# Patient Record
Sex: MALE | Race: BLACK OR AFRICAN AMERICAN | NOT HISPANIC OR LATINO | Employment: STUDENT | URBAN - METROPOLITAN AREA
[De-identification: names, ages, dates, MRNs, and addresses within clinical notes are randomized per-mention and may not be internally consistent; named-entity substitution may affect disease eponyms.]

---
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
## 2023-03-01 ENCOUNTER — HOSPITAL ENCOUNTER (EMERGENCY)
Facility: HOSPITAL | Age: 18
Discharge: HOME | End: 2023-03-01
Attending: EMERGENCY MEDICINE
Payer: MEDICAID

## 2023-03-01 VITALS
HEART RATE: 75 BPM | DIASTOLIC BLOOD PRESSURE: 79 MMHG | WEIGHT: 140 LBS | TEMPERATURE: 99 F | SYSTOLIC BLOOD PRESSURE: 128 MMHG | RESPIRATION RATE: 18 BRPM | OXYGEN SATURATION: 100 % | BODY MASS INDEX: 20.04 KG/M2 | HEIGHT: 70 IN

## 2023-03-01 DIAGNOSIS — N50.89 TESTICLE SWELLING: ICD-10-CM

## 2023-03-01 DIAGNOSIS — N43.3 RIGHT HYDROCELE: Primary | ICD-10-CM

## 2023-03-01 DIAGNOSIS — N45.1 EPIDIDYMITIS, RIGHT: ICD-10-CM

## 2023-03-01 LAB
BILIRUBIN, POC UA: NEGATIVE
BLOOD, POC UA: ABNORMAL
CLARITY, POC UA: CLEAR
COLOR, POC UA: YELLOW
GLUCOSE, POC UA: NEGATIVE
KETONES, POC UA: NEGATIVE
LEUKOCYTE EST, POC UA: NEGATIVE
NITRITE, POC UA: NEGATIVE
PH UR STRIP: 6 [PH]
PROTEIN, POC UA: ABNORMAL
SPECIFIC GRAVITY, POC UA: >=1.03
UROBILINOGEN, POC UA: 1 E.U./DL

## 2023-03-01 PROCEDURE — 96372 THER/PROPH/DIAG INJ SC/IM: CPT | Performed by: NURSE PRACTITIONER

## 2023-03-01 PROCEDURE — 25000003 PHARM REV CODE 250: Mod: ER | Performed by: NURSE PRACTITIONER

## 2023-03-01 PROCEDURE — 87591 N.GONORRHOEAE DNA AMP PROB: CPT | Performed by: NURSE PRACTITIONER

## 2023-03-01 PROCEDURE — 63600175 PHARM REV CODE 636 W HCPCS: Mod: ER | Performed by: NURSE PRACTITIONER

## 2023-03-01 PROCEDURE — 99285 EMERGENCY DEPT VISIT HI MDM: CPT | Mod: ER

## 2023-03-01 RX ORDER — LIDOCAINE HYDROCHLORIDE 10 MG/ML
5 INJECTION INFILTRATION; PERINEURAL
Status: COMPLETED | OUTPATIENT
Start: 2023-03-01 | End: 2023-03-01

## 2023-03-01 RX ORDER — CEFTRIAXONE 500 MG/1
500 INJECTION, POWDER, FOR SOLUTION INTRAMUSCULAR; INTRAVENOUS
Status: COMPLETED | OUTPATIENT
Start: 2023-03-01 | End: 2023-03-01

## 2023-03-01 RX ORDER — SULINDAC 150 MG/1
150 TABLET ORAL 2 TIMES DAILY
Qty: 10 TABLET | Refills: 0 | Status: SHIPPED | OUTPATIENT
Start: 2023-03-01 | End: 2023-03-06

## 2023-03-01 RX ORDER — LEVOFLOXACIN 500 MG/1
500 TABLET, FILM COATED ORAL
Status: COMPLETED | OUTPATIENT
Start: 2023-03-01 | End: 2023-03-01

## 2023-03-01 RX ORDER — LEVOFLOXACIN 500 MG/1
500 TABLET, FILM COATED ORAL DAILY
Qty: 9 TABLET | Refills: 0 | Status: SHIPPED | OUTPATIENT
Start: 2023-03-02 | End: 2023-03-11

## 2023-03-01 RX ADMIN — LEVOFLOXACIN 500 MG: 500 TABLET, FILM COATED ORAL at 05:03

## 2023-03-01 RX ADMIN — CEFTRIAXONE SODIUM 500 MG: 500 INJECTION, POWDER, FOR SOLUTION INTRAMUSCULAR; INTRAVENOUS at 05:03

## 2023-03-01 RX ADMIN — LIDOCAINE HYDROCHLORIDE 5 ML: 10 INJECTION, SOLUTION INFILTRATION; PERINEURAL at 05:03

## 2023-03-01 NOTE — DISCHARGE INSTRUCTIONS
Take antibiotics as ordered. You have been prescribed clinoril (sulindac), an anti-inflammatory.  Take this medication whether you feel you need it or not.  Do not take ibuprofen, naproxen or other NSAID's medications while taking this medication. Return to the Emergency Department for any worsening, change in condition, or any emergent concerns.

## 2023-03-01 NOTE — ED PROVIDER NOTES
Encounter Date: 3/1/2023    SCRIBE #1 NOTE: I, Angy Pennington, am scribing for, and in the presence of,  Nicolas Tejeda DNP. I have scribed the following portions of the note - Other sections scribed: HPI, ROS, PE.     History     Chief Complaint   Patient presents with    Groin Swelling     Intermittent over past week     18 year old male presents to the ER for intermittent right testicular pain and swelling for one week, which he rates as 9/10. No associated symptoms including urinary frequency urgency hematuria or dysuria, penile discharge or bleeding, lumps bumps or lesions.. No treatment for symptoms. Denies fever, abdominal pain, frequency, urgency, hematuria, diarrhea, or other symptoms. No further complaints at present time.      The history is provided by the patient. No  was used.   Review of patient's allergies indicates:  No Known Allergies  No past medical history on file.  No past surgical history on file.  No family history on file.     Review of Systems   Constitutional:  Negative for appetite change, chills, diaphoresis, fatigue and fever.   HENT:  Negative for congestion, ear discharge, ear pain, postnasal drip, rhinorrhea, sinus pressure, sneezing, sore throat and voice change.    Eyes:  Negative for discharge, itching and visual disturbance.   Respiratory:  Negative for cough, shortness of breath and wheezing.    Cardiovascular:  Negative for chest pain, palpitations and leg swelling.   Gastrointestinal:  Negative for abdominal pain, nausea and vomiting.   Endocrine: Negative for polydipsia, polyphagia and polyuria.   Genitourinary:  Negative for difficulty urinating, dysuria, frequency, hematuria, penile discharge, penile pain, penile swelling and urgency.        Pos groin pain and swelling.    Musculoskeletal:  Negative for arthralgias and myalgias.   Skin:  Negative for rash and wound.   Neurological:  Negative for dizziness, seizures, syncope and weakness.    Hematological:  Negative for adenopathy. Does not bruise/bleed easily.   Psychiatric/Behavioral:  Negative for agitation and self-injury. The patient is not nervous/anxious.      Physical Exam     Initial Vitals [03/01/23 1558]   BP Pulse Resp Temp SpO2   129/86 70 20 98.6 °F (37 °C) 100 %      MAP       --         Physical Exam    Nursing note and vitals reviewed.  Constitutional: He appears well-developed and well-nourished. He is not diaphoretic. No distress.   HENT:   Head: Normocephalic and atraumatic.   Right Ear: External ear normal.   Left Ear: External ear normal.   Nose: Nose normal.   Eyes: Pupils are equal, round, and reactive to light. Right eye exhibits no discharge. Left eye exhibits no discharge. No scleral icterus.   Neck:   Normal range of motion.  Pulmonary/Chest: No respiratory distress.   Abdominal: He exhibits no distension. Hernia confirmed negative in the right inguinal area and confirmed negative in the left inguinal area.   Genitourinary:    Penis normal.   Right testis shows swelling and tenderness. Right testis shows no mass. Right testis is descended. Cremasteric reflex is not absent on the right side. Left testis shows no mass, no swelling and no tenderness. Left testis is descended. Cremasteric reflex is not absent on the left side.   Musculoskeletal:         General: Normal range of motion.      Cervical back: Normal range of motion.     Lymphadenopathy: No inguinal adenopathy noted on the right or left side.   Neurological: He is alert and oriented to person, place, and time.   Skin: Skin is dry. Capillary refill takes less than 2 seconds.       ED Course   Procedures  Labs Reviewed   POCT URINALYSIS W/O SCOPE - Abnormal; Notable for the following components:       Result Value    Spec Grav UA >=1.030 (*)     Blood, UA Trace-intact (*)     Protein, UA 2+ (*)     All other components within normal limits   C. TRACHOMATIS/N. GONORRHOEAE BY AMP DNA   POCT URINALYSIS W/O SCOPE           Imaging Results              US Scrotum And Testicles (Final result)  Result time 03/01/23 16:53:02      Final result by Donaldo Aquino MD (03/01/23 16:53:02)                   Impression:      Complex right hydrocele, possibly reactive, no convincing sonographic findings to suggest right epididymo-orchitis.    Left varicocele.      Electronically signed by: Donaldo Aquino MD  Date:    03/01/2023  Time:    16:53               Narrative:    EXAMINATION:  US SCROTUM AND TESTICLES    CLINICAL HISTORY:  Other specified disorders of the male genital organs    TECHNIQUE:  Sonography of the scrotum and testes.    COMPARISON:  None.    FINDINGS:  The right testicle measures approximately 2.3 x 2.2 x 2.8 cm with homogeneous echotexture.  Arterial and venous flow is documented to the right testicle without abnormal testicular or epididymal hyperemia.  There is a 2-3 mm right epididymal head cyst.  No right varicocele.  There is a complex.  Right testicular appendage noted.    The left testicle measures approximately 1.9 x 2.1 x 2.9 cm with homogeneous echotexture.  Arterial and venous flow is documented to the left testicle without abnormal testicular or epididymal hyperemia.  There is no left hydrocele.  There is left varicocele.                                       Medications   cefTRIAXone injection 500 mg (500 mg Intramuscular Given 3/1/23 1713)   LIDOcaine HCL 10 mg/ml (1%) injection 5 mL (5 mLs Infiltration Given 3/1/23 1713)   levoFLOXacin tablet 500 mg (500 mg Oral Given 3/1/23 1713)     Medical Decision Making:   History:   Old Medical Records: I decided to obtain old medical records.  Initial Assessment:   18 year old male presents to the ER for groin pain and swelling for 1 week. Denies any associated symptoms. Denies treatment for symptoms. Scrotum and testicles US ordered.  On genitourinary examination the right testicle was swollen and tender, the patient was unable to localize tenderness.  The left  testing was normal.  Penis was normal.  There were no lumps bumps lesions, hernias, penile discharge or swelling.  Differential Diagnosis:   Includes but is not limited to: inguinal hernia, femoral hernia, reactive or malignant adenopathy, iliac or femoral aneurysm or pseudoaneurysm, sebaceous cyst, epididymitis, testicular torsion, lipoma, hidradenitis, varicocele, hydrocele, ectopic testicle, and undescended testicle.  Clinical Tests:   Lab Tests: Reviewed and Ordered  Radiological Study: Ordered and Reviewed  ED Management:  Following results of the ultrasound below as well as the urinalysis I treated the patient with Rocephin 500 mg with lidocaine IM as well as Levaquin 500 mg p.o. q.day times 10 days.  He is discharged to follow-up with urology for hydrocele and epididymitis orchitis.  Clinoril was prescribed for pain and inflammation.      Scribe attestation: Scribe attestation: Nicolas HAMILTON DNP ACNP-BC FNP-C ENP-C,  personally performed the services described in this documentation. All medical record entries made by the scribe were at my direction and in my presence.  I have reviewed the chart and agree that the record reflects my personal performance and is accurate and complete.      Scribe Attestation:   Scribe #1: I performed the above scribed service and the documentation accurately describes the services I performed. I attest to the accuracy of the note.      ED Course as of 03/01/23 2327   Wed Mar 01, 2023   1610 BP: 129/86 [VC]   1611 Temp: 98.6 °F (37 °C) [VC]   1611 Temp Source: Oral [VC]   1611 Pulse: 70 [VC]   1611 Resp: 20 [VC]   1611 SpO2: 100 % [VC]   1704 US Scrotum And Testicles  Complex right hydrocele, possibly reactive, no convincing sonographic findings to suggest right epididymo-orchitis.     Left varicocele. [VC]   1704 C. trachomatis/N. gonorrhoeae by AMP DNA  Pending at time of disposition. [VC]      ED Course User Index  [VC] Nicolas Tejeda DNP                 Clinical  "Impression:   Final diagnoses:  [N50.89] Testicle swelling  [N43.3] Right hydrocele (Primary)  [N45.1] Epididymitis, right        ED Disposition Condition    Discharge Stable        Vital signs at the time of disposition were:  /79 (BP Location: Right arm, Patient Position: Sitting)   Pulse 75   Temp 98.7 °F (37.1 °C) (Oral)   Resp 18   Ht 5' 10" (1.778 m)   Wt 63.5 kg (140 lb)   SpO2 100%   BMI 20.09 kg/m²       See AVS for additional recommendations. Medications listed herein were prescribed after reviewing the patient's allergies, medication list, history, most recent laboratories as available.  Referrals below were provided after reviewing the patient's previous medical providers. He understands he  should return for any worsening or changes in condition.  Prior to discharge the patient was asked if he  had any additional concerns or complaints and he declined. The patient was given an opportunity to ask questions and all were answered to his satisfaction.   ED Prescriptions       Medication Sig Dispense Start Date End Date Auth. Provider    levoFLOXacin (LEVAQUIN) 500 MG tablet Take 1 tablet (500 mg total) by mouth once daily. for 9 days 9 tablet 3/2/2023 3/11/2023 Nicolas Tejeda DNP    sulindac (CLINORIL) 150 MG tablet Take 1 tablet (150 mg total) by mouth 2 (two) times daily. for 5 days 10 tablet 3/1/2023 3/6/2023 Nicolas Tejeda DNP          Follow-up Information       Follow up With Specialties Details Why Contact Info    Jack Scott MD Urology Schedule an appointment as soon as possible for a visit   [address]  [address]  [facility] [address]  [phone]               Nicolas Tejeda DNP  03/01/23 2327    "

## 2023-03-03 LAB
C TRACH DNA SPEC QL NAA+PROBE: NOT DETECTED
N GONORRHOEA DNA SPEC QL NAA+PROBE: NOT DETECTED

## 2023-06-20 ENCOUNTER — HOSPITAL ENCOUNTER (EMERGENCY)
Facility: HOSPITAL | Age: 18
Discharge: HOME | End: 2023-06-20
Attending: EMERGENCY MEDICINE
Payer: MEDICAID

## 2023-06-20 VITALS
WEIGHT: 140 LBS | BODY MASS INDEX: 20.04 KG/M2 | HEART RATE: 61 BPM | TEMPERATURE: 99 F | RESPIRATION RATE: 16 BRPM | DIASTOLIC BLOOD PRESSURE: 74 MMHG | OXYGEN SATURATION: 99 % | HEIGHT: 70 IN | SYSTOLIC BLOOD PRESSURE: 120 MMHG

## 2023-06-20 DIAGNOSIS — N43.3 HYDROCELE IN ADULT: Primary | ICD-10-CM

## 2023-06-20 DIAGNOSIS — N43.3 HYDROCELE, UNSPECIFIED HYDROCELE TYPE: ICD-10-CM

## 2023-06-20 PROCEDURE — 99282 EMERGENCY DEPT VISIT SF MDM: CPT | Mod: ER

## 2023-06-21 NOTE — ED PROVIDER NOTES
"Encounter Date: 6/20/2023    SCRIBE #1 NOTE: I, Fabian Jackson, am scribing for, and in the presence of,  Armond Allen MD. I have scribed the following portions of the note - Other sections scribed: HPI,ROS,PE.     History     Chief Complaint   Patient presents with    Groin Swelling     Pt reports right side testicle swelling x 1 month. Pt denies pain.      Nic Bennett is a 18 y.o. male, with no pertinent PMHx, who presents to the ED with right sided testicular swelling onset 1 month ago. Patient reports being seen by a urologist and having an ultrasound with no abnormalities found. Patient reports that his testicle keeps getting bigger and then "going down" which is what brought him into the ED today. No other exacerbating or alleviating factors. Denies penile pain, testicular pain or other associated symptoms. Patient has no other complaints at the present time.      The history is provided by the patient. No  was used.   Review of patient's allergies indicates:  No Known Allergies  No past medical history on file.  No past surgical history on file.  No family history on file.     Review of Systems   Constitutional: Negative.  Negative for fever.   HENT: Negative.  Negative for sore throat.    Eyes: Negative.    Respiratory: Negative.  Negative for shortness of breath.    Cardiovascular: Negative.  Negative for chest pain.   Gastrointestinal: Negative.  Negative for nausea and vomiting.   Endocrine: Negative.    Genitourinary:  Positive for scrotal swelling. Negative for dysuria, penile pain, penile swelling and testicular pain.   Musculoskeletal: Negative.  Negative for myalgias.   Skin:  Negative for rash.   Allergic/Immunologic: Negative.    Neurological: Negative.  Negative for headaches.   Hematological: Negative.  Negative for adenopathy.   Psychiatric/Behavioral: Negative.  Negative for behavioral problems.    All other systems reviewed and are negative.    Physical " Exam     Initial Vitals [06/20/23 2017]   BP Pulse Resp Temp SpO2   120/74 61 16 98.6 °F (37 °C) 99 %      MAP       --         Physical Exam    Nursing note and vitals reviewed.  Constitutional: He appears well-developed and well-nourished.   HENT:   Head: Normocephalic and atraumatic.   Eyes: Conjunctivae and EOM are normal.   Neck: Neck supple.   Normal range of motion.  Cardiovascular:  Normal rate and intact distal pulses.     Exam reveals no gallop and no friction rub.       No murmur heard.  Pulmonary/Chest: Effort normal. No respiratory distress.   Abdominal: Abdomen is soft and flat. There is no abdominal tenderness.   Genitourinary: Right testis shows swelling. Right testis shows no tenderness. Cremasteric reflex is not absent on the right side. Left testis shows no tenderness. Cremasteric reflex is not absent on the left side.   Musculoskeletal:         General: Normal range of motion.      Cervical back: Normal range of motion and neck supple.     Neurological: He is alert and oriented to person, place, and time.   Skin: Skin is warm and dry.   Psychiatric: He has a normal mood and affect. His behavior is normal.       ED Course   Procedures  Labs Reviewed - No data to display       Results for orders placed or performed during the hospital encounter of 03/01/23   C. trachomatis/N. gonorrhoeae by AMP DNA    Specimen: Genital   Result Value Ref Range    Chlamydia, Amplified DNA Not Detected Not Detected    N gonorrhoeae, amplified DNA Not Detected Not Detected   POCT URINALYSIS W/O SCOPE   Result Value Ref Range    Glucose, UA Negative     Bilirubin, UA Negative     Ketones, UA Negative     Spec Grav UA >=1.030 (>)     Blood, UA Trace-intact (A)     PH, UA 6.0     Protein, UA 2+ (A)     Urobilinogen, UA 1.0 E.U./dL    Nitrite, UA Negative     Leukocytes, UA Negative     Color, UA Yellow     Clarity, UA Clear          Imaging Results    None          Medications - No data to display  Medical Decision  Making:   History:   Old Medical Records: I decided to obtain old medical records.  Old Records Summarized: other records.       <> Summary of Records: External documents reviewed.     ED Management:  This patient presents in has a deficient see and knowledge.  Patient previously seen and diagnosed with hydrocele presents with no new symptomatology but now with his mother who wants to know what is going on.  I reviewed the patient's prior evaluation for this and explained to the mother with a hydrocele was.  Patient has actually already followed up with Urology.        Scribe Attestation:   Scribe #1: I performed the above scribed service and the documentation accurately describes the services I performed. I attest to the accuracy of the note.            This document was produced by a scribe under my direction and in my presence. I agree with the content of the note and have made any necessary edits.     Armond Allen MD    06/21/2023 6:31 AM         Clinical Impression:   Final diagnoses:  [N43.3] Hydrocele in adult (Primary)  [N43.3] Hydrocele, unspecified hydrocele type        ED Disposition Condition    Discharge Stable          ED Prescriptions    None       Follow-up Information       Follow up With Specialties Details Why Contact Info    Urology  Schedule an appointment as soon as possible for a visit in 1 week      Orthopedics  Schedule an appointment as soon as possible for a visit in 1 week               Armond Allen MD  06/21/23 0603

## 2023-09-07 ENCOUNTER — HOSPITAL ENCOUNTER (EMERGENCY)
Facility: HOSPITAL | Age: 18
Discharge: HOME | End: 2023-09-07
Attending: EMERGENCY MEDICINE
Payer: MEDICAID

## 2023-09-07 VITALS
TEMPERATURE: 98 F | WEIGHT: 140 LBS | OXYGEN SATURATION: 100 % | RESPIRATION RATE: 18 BRPM | HEART RATE: 57 BPM | HEIGHT: 71 IN | BODY MASS INDEX: 19.6 KG/M2 | DIASTOLIC BLOOD PRESSURE: 73 MMHG | SYSTOLIC BLOOD PRESSURE: 123 MMHG

## 2023-09-07 DIAGNOSIS — N43.3 HYDROCELE OF TESTIS: Primary | ICD-10-CM

## 2023-09-07 LAB
BILIRUB UR QL STRIP: NEGATIVE
CLARITY UR: CLEAR
COLOR UR: YELLOW
GLUCOSE UR QL STRIP: NEGATIVE
HGB UR QL STRIP: NEGATIVE
KETONES UR QL STRIP: NEGATIVE
LEUKOCYTE ESTERASE UR QL STRIP: NEGATIVE
NITRITE UR QL STRIP: NEGATIVE
PH UR STRIP: 7 [PH] (ref 5–8)
PROT UR QL STRIP: NEGATIVE
SP GR UR STRIP: 1.02 (ref 1–1.03)
URN SPEC COLLECT METH UR: NORMAL
UROBILINOGEN UR STRIP-ACNC: NEGATIVE EU/DL

## 2023-09-07 PROCEDURE — 81003 URINALYSIS AUTO W/O SCOPE: CPT | Performed by: NURSE PRACTITIONER

## 2023-09-07 PROCEDURE — 99282 EMERGENCY DEPT VISIT SF MDM: CPT

## 2023-09-07 PROCEDURE — 87491 CHLMYD TRACH DNA AMP PROBE: CPT | Performed by: NURSE PRACTITIONER

## 2023-09-07 NOTE — ED TRIAGE NOTES
Pt reports to the ED for CC of right testicle swelling. This is a chronic issue for the pt but the pt states the swelling had went down and when it came back, it was bigger than it had been previously. Pt denies pain, fever, penile discharge. Pt is aoox4 and in no apparent distress at this time.

## 2023-09-07 NOTE — Clinical Note
"Nic ISRAEL"Estefani Bennett was seen and treated in our emergency department on 9/7/2023.  He may return to school on 09/08/2023.      If you have any questions or concerns, please don't hesitate to call.      Dione Chavez, KENNEDY"

## 2023-09-07 NOTE — ED PROVIDER NOTES
Encounter Date: 9/7/2023       History     Chief Complaint   Patient presents with    Testicle Pain     17 yo male to triage for right sided testicle pain and swelling ongoing for months now. Pt says its starting to get bigger so he wanted to get it checked. Denies unprotected sex, discharge, itching, or burning w/ urination. Denies Trauma/injury. VSS, NAD, AAOx4     17 y/o male which presents to the ED with continued swelling to his right testicle. Patient denies pain. This is not a new problem and he has seen urology for the same problem. Denies urinary symptoms or penile discharge.     The history is provided by the patient.     Review of patient's allergies indicates:  No Known Allergies  History reviewed. No pertinent past medical history.  History reviewed. No pertinent surgical history.  History reviewed. No pertinent family history.     Review of Systems   Constitutional:  Negative for fever.   HENT:  Negative for sore throat.    Respiratory:  Negative for shortness of breath.    Cardiovascular:  Negative for chest pain.   Gastrointestinal:  Negative for nausea.   Genitourinary:  Negative for dysuria.        Testicular swelling   Musculoskeletal:  Negative for back pain.   Skin:  Negative for rash.   Neurological:  Negative for weakness.   Hematological:  Does not bruise/bleed easily.   All other systems reviewed and are negative.      Physical Exam     Initial Vitals [09/07/23 1531]   BP Pulse Resp Temp SpO2   123/73 (!) 57 18 97.7 °F (36.5 °C) 100 %      MAP       --         Physical Exam    Nursing note and vitals reviewed.  Constitutional: He appears well-developed and well-nourished.   HENT:   Head: Normocephalic and atraumatic.   Eyes: Conjunctivae and EOM are normal. Pupils are equal, round, and reactive to light.   Neck:   Normal range of motion.  Cardiovascular:  Normal rate, regular rhythm, normal heart sounds and intact distal pulses.     Exam reveals no gallop and no friction rub.       No murmur  heard.  Pulmonary/Chest: Breath sounds normal. No respiratory distress. He has no wheezes. He has no rhonchi. He has no rales. He exhibits no tenderness.   Genitourinary:    Penis normal.   No discharge found.    Genitourinary Comments: Minor right testicular swelling consistent with his chronic hydrocele     Musculoskeletal:      Cervical back: Normal range of motion.     Neurological: He is alert and oriented to person, place, and time. He has normal strength. GCS score is 15. GCS eye subscore is 4. GCS verbal subscore is 5. GCS motor subscore is 6.   Skin: Skin is warm. Capillary refill takes less than 2 seconds.   Psychiatric: He has a normal mood and affect.         ED Course   Procedures  Labs Reviewed   C. TRACHOMATIS/N. GONORRHOEAE BY AMP DNA   URINALYSIS, REFLEX TO URINE CULTURE    Narrative:     Specimen Source->Urine          Imaging Results    None          Medications - No data to display  Medical Decision Making  19 y/o with a known complex hydrocele that has follow up with urology, Dr. Wasserman, which presents to the ED with swelling that began again today. He states it has come and gone since earlier this year. He has not being wearing scrotal support when the swelling begins which I advised will prevent the swelling from getting worse. Urinalysis normal. GC/Chlamydia pending. Patient given strict return precautions and voiced understanding of all discharge instructions. Pt was stable at discharge.           Problems Addressed:  Hydrocele of testis: chronic illness or injury with exacerbation, progression, or side effects of treatment    Amount and/or Complexity of Data Reviewed  Labs: ordered.    Risk  OTC drugs.               ED Course as of 09/07/23 1636   Thu Sep 07, 2023   1535 BP: 123/73 [AT]   1535 Temp: 97.7 °F (36.5 °C) [AT]   1535 Temp Source: Oral [AT]   1535 Pulse(!): 57 [AT]   1535 Resp: 18 [AT]   1535 SpO2: 100 % [AT]      ED Course User Index  [AT] Dione Chavez, KENNEDY                     Clinical Impression:   Final diagnoses:  [N43.3] Hydrocele of testis (Primary)        ED Disposition Condition    Discharge Stable          ED Prescriptions    None       Follow-up Information       Follow up With Specialties Details Why Contact Info    Arun Wasserman MD Urology   [address] [place] [id number]  [phone]               Dione Chavez, [facility]  09/07/23 4633

## 2023-09-08 LAB
C TRACH DNA SPEC QL NAA+PROBE: NOT DETECTED
N GONORRHOEA DNA SPEC QL NAA+PROBE: NOT DETECTED

## 2024-07-22 ENCOUNTER — OFFICE VISIT (OUTPATIENT)
Facility: CLINIC | Age: 19
End: 2024-07-22
Payer: MEDICAID

## 2024-07-22 ENCOUNTER — PATIENT OUTREACH (OUTPATIENT)
Dept: ADMINISTRATIVE | Facility: OTHER | Age: 19
End: 2024-07-22
Payer: MEDICAID

## 2024-07-22 VITALS
TEMPERATURE: 98 F | WEIGHT: 142 LBS | BODY MASS INDEX: 19.88 KG/M2 | HEART RATE: 62 BPM | RESPIRATION RATE: 18 BRPM | SYSTOLIC BLOOD PRESSURE: 118 MMHG | OXYGEN SATURATION: 99 % | HEIGHT: 71 IN | DIASTOLIC BLOOD PRESSURE: 78 MMHG

## 2024-07-22 DIAGNOSIS — N50.89 TESTICLE SWELLING: ICD-10-CM

## 2024-07-22 DIAGNOSIS — Z00.00 ANNUAL PHYSICAL EXAM: Primary | ICD-10-CM

## 2024-07-22 PROCEDURE — 3074F SYST BP LT 130 MM HG: CPT | Mod: CPTII,,,

## 2024-07-22 PROCEDURE — 99213 OFFICE O/P EST LOW 20 MIN: CPT | Mod: S$PBB,25,,

## 2024-07-22 PROCEDURE — 3008F BODY MASS INDEX DOCD: CPT | Mod: CPTII,,,

## 2024-07-22 PROCEDURE — 99213 OFFICE O/P EST LOW 20 MIN: CPT | Mod: PBBFAC,PN

## 2024-07-22 PROCEDURE — 1159F MED LIST DOCD IN RCRD: CPT | Mod: CPTII,,,

## 2024-07-22 PROCEDURE — 3078F DIAST BP <80 MM HG: CPT | Mod: CPTII,,,

## 2024-07-22 PROCEDURE — 99999 PR PBB SHADOW E&M-EST. PATIENT-LVL III: CPT | Mod: PBBFAC,,,

## 2024-07-22 PROCEDURE — 99385 PREV VISIT NEW AGE 18-39: CPT | Mod: S$PBB,1E,GY,

## 2024-07-22 NOTE — PROGRESS NOTES
SUBJECTIVE     Chief Complaint   Patient presents with    Establish Care    Groin Swelling     Pt states his testicles has been swollen for a year plus has fluid and pain somestimes goes and comes       HPI  Nic Bennett is a 19 y.o. male with past medical history of hydrocele of right testicle that presents for annual exam and evaluation of testicle. Pt is new to me and to the clinic. Pt states has been doing pretty good over all with health. He has a good appetite and eats well. He does exercise weight training at home usually. He sleeps for ~9 hours nightly. Pt does not take OTC supplements. He does not have any current stressors. Pt is UTD on age appropriate CA screening.    Pt also complains today of ongoing intermittent right testicle swelling that comes and goes. Pt states he has been seen several times in the ER over the last year or so due to swelling. Pt states he does not have as much swelling today but is concerned and wanted to be evaluated.     PAST MEDICAL HISTORY:  Past Medical History:   Diagnosis Date    Asthma        PAST SURGICAL HISTORY:  History reviewed. No pertinent surgical history.    SOCIAL HISTORY:  Social History     Socioeconomic History    Marital status: Single   Occupational History    Occupation: Security   Tobacco Use    Smoking status: Never     Passive exposure: Never    Smokeless tobacco: Never   Substance and Sexual Activity    Alcohol use: Not Currently    Drug use: Yes     Types: Marijuana    Sexual activity: Not Currently     Partners: Female     Social Determinants of Health     Financial Resource Strain: Low Risk  (7/22/2024)    Overall Financial Resource Strain (CARDIA)     Difficulty of Paying Living Expenses: Not hard at all   Food Insecurity: No Food Insecurity (7/22/2024)    Hunger Vital Sign     Worried About Running Out of Food in the Last Year: Never true     Ran Out of Food in the Last Year: Never true   Transportation Needs: No Transportation Needs  (7/22/2024)    PRAPARE - Transportation     Lack of Transportation (Medical): No     Lack of Transportation (Non-Medical): No   Physical Activity: Sufficiently Active (7/22/2024)    Exercise Vital Sign     Days of Exercise per Week: 7 days     Minutes of Exercise per Session: 30 min   Housing Stability: Low Risk  (7/22/2024)    Housing Stability Vital Sign     Unable to Pay for Housing in the Last Year: No     Homeless in the Last Year: No       FAMILY HISTORY:  Family History   Problem Relation Name Age of Onset    Breast cancer Mother      No Known Problems Father      Diabetes Sister      No Known Problems Sister      No Known Problems Sister      No Known Problems Brother      No Known Problems Maternal Grandmother      No Known Problems Maternal Grandfather      No Known Problems Paternal Grandmother      No Known Problems Paternal Grandfather      No Known Problems Daughter      No Known Problems Son      No Known Problems Maternal Aunt      No Known Problems Maternal Uncle      No Known Problems Paternal Aunt      No Known Problems Paternal Uncle      No Known Problems Maternal Cousin      No Known Problems Paternal Cousin      No Known Problems Other         ALLERGIES AND MEDICATIONS: updated and reviewed.  Review of patient's allergies indicates:  No Known Allergies  No current outpatient medications on file.     No current facility-administered medications for this visit.       ROS  Review of Systems   Constitutional:  Negative for appetite change, chills, diaphoresis, fatigue, fever and unexpected weight change.   HENT:  Negative for congestion, ear pain, postnasal drip, rhinorrhea, sinus pressure, sinus pain and sore throat.    Eyes:  Negative for pain and redness.   Respiratory:  Negative for cough, choking, chest tightness and shortness of breath.    Cardiovascular:  Negative for chest pain, palpitations and leg swelling.   Gastrointestinal:  Negative for abdominal pain, constipation, diarrhea, nausea  "and vomiting.   Endocrine: Negative for cold intolerance and heat intolerance.   Genitourinary:  Positive for scrotal swelling. Negative for difficulty urinating, dysuria, flank pain, genital sores, penile discharge, penile pain, penile swelling and urgency.   Musculoskeletal:  Negative for back pain, neck pain and neck stiffness.   Skin:  Negative for color change, pallor and rash.   Allergic/Immunologic: Negative for food allergies.   Neurological:  Negative for dizziness, tremors, weakness, light-headedness and headaches.   Psychiatric/Behavioral:  Negative for agitation and confusion. The patient is not nervous/anxious.        OBJECTIVE     Physical Exam  Vitals:    07/22/24 0839   BP: 118/78   Pulse: 62   Resp: 18   Temp: 98.2 °F (36.8 °C)    Body mass index is 19.8 kg/m².  Weight: 64.4 kg (141 lb 15.6 oz)   Height: 5' 11" (180.3 cm)     Physical Exam  Constitutional:       General: He is not in acute distress.     Appearance: Normal appearance. He is not ill-appearing or diaphoretic.   HENT:      Right Ear: Tympanic membrane normal. There is no impacted cerumen.      Left Ear: Tympanic membrane normal. There is no impacted cerumen.      Nose: Nose normal. No congestion or rhinorrhea.      Mouth/Throat:      Mouth: Mucous membranes are moist.      Pharynx: Oropharynx is clear. No oropharyngeal exudate or posterior oropharyngeal erythema.   Eyes:      General:         Right eye: No discharge.         Left eye: No discharge.   Cardiovascular:      Rate and Rhythm: Normal rate and regular rhythm.      Pulses: Normal pulses.      Heart sounds: Normal heart sounds.   Pulmonary:      Effort: Pulmonary effort is normal.      Breath sounds: Normal breath sounds.   Abdominal:      General: Bowel sounds are normal. There is no distension.      Palpations: Abdomen is soft.      Tenderness: There is no abdominal tenderness. There is no guarding.   Genitourinary:     Penis: Normal. No erythema, swelling or lesions.       " Testes:         Right: Testicular hydrocele present. Mass, tenderness or swelling not present.         Left: Mass, tenderness, swelling or testicular hydrocele not present.   Musculoskeletal:         General: No swelling or tenderness.      Cervical back: Normal range of motion. No rigidity or tenderness.      Right lower leg: No edema.      Left lower leg: No edema.   Skin:     General: Skin is warm and dry.      Findings: No bruising, erythema, lesion or rash.   Neurological:      Mental Status: He is alert and oriented to person, place, and time.      Motor: No weakness.      Gait: Gait normal.   Psychiatric:         Mood and Affect: Mood normal.         Behavior: Behavior normal.         Health Maintenance         Date Due Completion Date    Hepatitis C Screening Never done ---    Lipid Panel Never done ---    COVID-19 Vaccine (1 - 2023-24 season) Never done ---    Influenza Vaccine (1) 09/01/2024 2/5/2016    TETANUS VACCINE 08/04/2025 8/4/2015              ASSESSMENT     19 y.o. male with     1. Annual physical exam    2. Testicle swelling        PLAN:     1. Annual physical exam  Counseled on age appropriate medical preventative services, including age appropriate cancer screenings, over all nutritional health, need for a consistent exercise regimen and an over all push towards maintaining a vigorous and active lifestyle.  Counseled on age appropriate vaccines and discussed upcoming health care needs based on age/gender.  Spent time with patient counseling on need for a good patient/provider relationship moving forward.  Discussed use of common OTC medications and supplements.  Discussed common dietary aids and use of caffeine and the need for good sleep hygiene and stress management.  - CBC Auto Differential; Future  - Comprehensive Metabolic Panel; Future  - Hemoglobin A1C; Future  - Lipid Panel; Future    2. Testicle swelling  New:Assessing  - Discussed with patient current symptoms and progression over the  last year   - Reviewed previous notes and ER encounters with patient   - Discussed with patient previous US imaging and their findings   - Pt denies pain or swelling like previously at this time   -educated patient in the use of supportive underwear and directed patient to return to clinic should symptoms worsen or discomfort is felt. Informed patient of surveillance monitoring at this time.    - Pt states understanding, will have patient to follow up in 3 months or sooner if needed     RTC in 3 months or sooner if needed    Wilder Mendez DNP, APRN, FNP-BC  [facility]   07/22/2024 9:16 AM        Follow up in about 3 months (around 10/22/2024), or sooner if needed.

## 2024-07-22 NOTE — PROGRESS NOTES
CHW - Initial Contact    This Community Health Worker completed  the Social Determinant of Health questionnaire with patient during clinic visit today.    Pt identified barriers of most importance are: NONE   Referrals to community agencies completed with patient/caregiver consent outside of [facility] include: NONE  Referrals were put through [facility] - : NO  Support and Services: No support & services have been documented.  Other information discussed the patient needs / wants help with: NONE   Follow up required:   No future outreach task assigned

## 2024-10-22 ENCOUNTER — LAB VISIT (OUTPATIENT)
Dept: LAB | Facility: HOSPITAL | Age: 19
End: 2024-10-22
Payer: MEDICAID

## 2024-10-22 ENCOUNTER — OFFICE VISIT (OUTPATIENT)
Facility: CLINIC | Age: 19
End: 2024-10-22
Payer: MEDICAID

## 2024-10-22 VITALS
SYSTOLIC BLOOD PRESSURE: 115 MMHG | TEMPERATURE: 98 F | DIASTOLIC BLOOD PRESSURE: 80 MMHG | OXYGEN SATURATION: 100 % | HEART RATE: 79 BPM | RESPIRATION RATE: 18 BRPM | WEIGHT: 145.31 LBS | BODY MASS INDEX: 20.34 KG/M2 | HEIGHT: 71 IN

## 2024-10-22 DIAGNOSIS — N50.89 TESTICLE SWELLING: Primary | ICD-10-CM

## 2024-10-22 DIAGNOSIS — Z00.00 ANNUAL PHYSICAL EXAM: ICD-10-CM

## 2024-10-22 LAB
ALBUMIN SERPL BCP-MCNC: 4.5 G/DL (ref 3.5–5.2)
ALP SERPL-CCNC: 68 U/L (ref 40–150)
ALT SERPL W/O P-5'-P-CCNC: 36 U/L (ref 10–44)
ANION GAP SERPL CALC-SCNC: 10 MMOL/L (ref 8–16)
AST SERPL-CCNC: 38 U/L (ref 10–40)
BASOPHILS # BLD AUTO: 0.02 K/UL (ref 0–0.2)
BASOPHILS NFR BLD: 0.4 % (ref 0–1.9)
BILIRUB SERPL-MCNC: 0.4 MG/DL (ref 0.1–1)
BUN SERPL-MCNC: 10 MG/DL (ref 6–20)
CALCIUM SERPL-MCNC: 9.8 MG/DL (ref 8.7–10.5)
CHLORIDE SERPL-SCNC: 103 MMOL/L (ref 95–110)
CHOLEST SERPL-MCNC: 157 MG/DL (ref 120–199)
CHOLEST/HDLC SERPL: 3.7 {RATIO} (ref 2–5)
CO2 SERPL-SCNC: 29 MMOL/L (ref 23–29)
CREAT SERPL-MCNC: 1 MG/DL (ref 0.5–1.4)
DIFFERENTIAL METHOD BLD: ABNORMAL
EOSINOPHIL # BLD AUTO: 0.2 K/UL (ref 0–0.5)
EOSINOPHIL NFR BLD: 3 % (ref 0–8)
ERYTHROCYTE [DISTWIDTH] IN BLOOD BY AUTOMATED COUNT: 13.1 % (ref 11.5–14.5)
EST. GFR  (NO RACE VARIABLE): >60 ML/MIN/1.73 M^2
GLUCOSE SERPL-MCNC: 87 MG/DL (ref 70–110)
HCT VFR BLD AUTO: 45.4 % (ref 40–54)
HDLC SERPL-MCNC: 42 MG/DL (ref 40–75)
HDLC SERPL: 26.8 % (ref 20–50)
HGB BLD-MCNC: 15.1 G/DL (ref 14–18)
IMM GRANULOCYTES # BLD AUTO: 0.01 K/UL (ref 0–0.04)
IMM GRANULOCYTES NFR BLD AUTO: 0.2 % (ref 0–0.5)
LDLC SERPL CALC-MCNC: 98.8 MG/DL (ref 63–159)
LYMPHOCYTES # BLD AUTO: 1.9 K/UL (ref 1–4.8)
LYMPHOCYTES NFR BLD: 36.7 % (ref 18–48)
MCH RBC QN AUTO: 29.3 PG (ref 27–31)
MCHC RBC AUTO-ENTMCNC: 33.3 G/DL (ref 32–36)
MCV RBC AUTO: 88 FL (ref 82–98)
MONOCYTES # BLD AUTO: 0.8 K/UL (ref 0.3–1)
MONOCYTES NFR BLD: 15.1 % (ref 4–15)
NEUTROPHILS # BLD AUTO: 2.3 K/UL (ref 1.8–7.7)
NEUTROPHILS NFR BLD: 44.6 % (ref 38–73)
NONHDLC SERPL-MCNC: 115 MG/DL
NRBC BLD-RTO: 0 /100 WBC
PLATELET # BLD AUTO: 254 K/UL (ref 150–450)
PMV BLD AUTO: 9.5 FL (ref 9.2–12.9)
POTASSIUM SERPL-SCNC: 4 MMOL/L (ref 3.5–5.1)
PROT SERPL-MCNC: 8.3 G/DL (ref 6–8.4)
RBC # BLD AUTO: 5.16 M/UL (ref 4.6–6.2)
SODIUM SERPL-SCNC: 142 MMOL/L (ref 136–145)
TRIGL SERPL-MCNC: 81 MG/DL (ref 30–150)
WBC # BLD AUTO: 5.04 K/UL (ref 3.9–12.7)

## 2024-10-22 PROCEDURE — 3079F DIAST BP 80-89 MM HG: CPT | Mod: CPTII,,,

## 2024-10-22 PROCEDURE — 99213 OFFICE O/P EST LOW 20 MIN: CPT | Mod: PBBFAC,PN

## 2024-10-22 PROCEDURE — 3008F BODY MASS INDEX DOCD: CPT | Mod: CPTII,,,

## 2024-10-22 PROCEDURE — 80053 COMPREHEN METABOLIC PANEL: CPT

## 2024-10-22 PROCEDURE — 1159F MED LIST DOCD IN RCRD: CPT | Mod: CPTII,,,

## 2024-10-22 PROCEDURE — 99999 PR PBB SHADOW E&M-EST. PATIENT-LVL III: CPT | Mod: PBBFAC,,,

## 2024-10-22 PROCEDURE — 80061 LIPID PANEL: CPT

## 2024-10-22 PROCEDURE — 1160F RVW MEDS BY RX/DR IN RCRD: CPT | Mod: CPTII,,,

## 2024-10-22 PROCEDURE — 85025 COMPLETE CBC W/AUTO DIFF WBC: CPT

## 2024-10-22 PROCEDURE — 3074F SYST BP LT 130 MM HG: CPT | Mod: CPTII,,,

## 2024-10-22 PROCEDURE — 83036 HEMOGLOBIN GLYCOSYLATED A1C: CPT

## 2024-10-22 PROCEDURE — 99213 OFFICE O/P EST LOW 20 MIN: CPT | Mod: S$PBB,,,

## 2024-10-22 RX ORDER — IBUPROFEN 600 MG/1
600 TABLET ORAL 3 TIMES DAILY PRN
Qty: 25 TABLET | Refills: 0 | Status: SHIPPED | OUTPATIENT
Start: 2024-10-22

## 2024-10-22 NOTE — PROGRESS NOTES
SUBJECTIVE     Chief Complaint   Patient presents with    Groin Swelling     Pt is concern for swollen testicles        HPI  Nic Bennett is a 19 y.o. male with multiple medical diagnoses as listed in the medical history and problem list that presents for evaluation of ongoing testicular swelling and pain.     History of Present Illness    CHIEF COMPLAINT:  Mr. Bennett presents today for follow-up of testicular swelling.    TESTICULAR SWELLING AND PAIN:  He reports persistent swelling in his right testicle with associated pain, particularly when lifting weights. Wearing a jock strap provides some relief, but pain persists. He denies pain during showering or other daily activities, but notes increased sensitivity and discomfort when the area is accidentally bumped. He has not taken any pain medication for this condition. He mentions a recurrence of the swelling in August.    IMAGING HISTORY:  Imaging in March last year revealed a complex right hydrocele and left varicocele. Follow-up in October showed a persistent left varicocele and mild right epididymal swelling. Most recent imaging in November demonstrated a small right hydrocele with no evidence of left varicocele.    PREVIOUS TREATMENT:  He received antibiotic treatment (levofloxacin and ceftriaxone injection) in March last year during an emergency room visit for a suspected infection.    SOCIAL HISTORY:  He is not sexually active. He uses marijuana daily and consumes alcohol occasionally.      ROS:  General: -fever, -chills, -fatigue, -weight gain, -weight loss  Eyes: -vision changes, -redness, -discharge  ENT: -ear pain, -nasal congestion, -sore throat  Cardiovascular: -chest pain, -palpitations, -lower extremity edema  Respiratory: -cough, -shortness of breath  Gastrointestinal: -abdominal pain, -nausea, -vomiting, -diarrhea, -constipation, -blood in stool  Genitourinary: -dysuria, -hematuria, -frequency  Musculoskeletal: -joint pain, -muscle pain,  -joint swelling  Skin: -rash, -lesion  Neurological: -headache, -dizziness, -numbness, -tingling  Psychiatric: -anxiety, -depression, -sleep difficulty  Allergic: -allergic reactions         PAST MEDICAL HISTORY:  Past Medical History:   Diagnosis Date    Asthma        PAST SURGICAL HISTORY:  History reviewed. No pertinent surgical history.    SOCIAL HISTORY:  Social History     Socioeconomic History    Marital status: Single   Occupational History    Occupation: Security   Tobacco Use    Smoking status: Never     Passive exposure: Never    Smokeless tobacco: Never   Substance and Sexual Activity    Alcohol use: Not Currently    Drug use: Yes     Types: Marijuana    Sexual activity: Not Currently     Partners: Female     Social Drivers of Health     Financial Resource Strain: Low Risk  (7/22/2024)    Overall Financial Resource Strain (CARDIA)     Difficulty of Paying Living Expenses: Not hard at all   Food Insecurity: No Food Insecurity (7/22/2024)    Hunger Vital Sign     Worried About Running Out of Food in the Last Year: Never true     Ran Out of Food in the Last Year: Never true   Transportation Needs: No Transportation Needs (7/22/2024)    PRAPARE - Transportation     Lack of Transportation (Medical): No     Lack of Transportation (Non-Medical): No   Physical Activity: Sufficiently Active (7/22/2024)    Exercise Vital Sign     Days of Exercise per Week: 7 days     Minutes of Exercise per Session: 30 min   Housing Stability: Low Risk  (7/22/2024)    Housing Stability Vital Sign     Unable to Pay for Housing in the Last Year: No     Homeless in the Last Year: No       FAMILY HISTORY:  Family History   Problem Relation Name Age of Onset    Breast cancer Mother      No Known Problems Father      Diabetes Sister      No Known Problems Sister      No Known Problems Sister      No Known Problems Brother      No Known Problems Maternal Grandmother      No Known Problems Maternal Grandfather      No Known Problems  "Paternal Grandmother      No Known Problems Paternal Grandfather      No Known Problems Daughter      No Known Problems Son      No Known Problems Maternal Aunt      No Known Problems Maternal Uncle      No Known Problems Paternal Aunt      No Known Problems Paternal Uncle      No Known Problems Maternal Cousin      No Known Problems Paternal Cousin      No Known Problems Other         ALLERGIES AND MEDICATIONS: updated and reviewed.  Review of patient's allergies indicates:  No Known Allergies  Current Outpatient Medications   Medication Sig Dispense Refill    ibuprofen (ADVIL,MOTRIN) 600 MG tablet Take 1 tablet (600 mg total) by mouth 3 (three) times daily as needed for Pain. 25 tablet 0     No current facility-administered medications for this visit.         OBJECTIVE     Physical Exam  Vitals:    10/22/24 1448   BP: 115/80   Pulse: 79   Resp: 18   Temp: 98 °F (36.7 °C)    Body mass index is 20.26 kg/m².  Weight: 65.9 kg (145 lb 4.5 oz)   Height: 5' 11" (180.3 cm)     Physical Exam  Exam conducted with a chaperone present.   Constitutional:       General: He is not in acute distress.     Appearance: Normal appearance. He is not ill-appearing or diaphoretic.   HENT:      Right Ear: Tympanic membrane normal. There is no impacted cerumen.      Left Ear: Tympanic membrane normal. There is no impacted cerumen.      Nose: Nose normal. No congestion or rhinorrhea.      Mouth/Throat:      Mouth: Mucous membranes are moist.      Pharynx: Oropharynx is clear. No oropharyngeal exudate or posterior oropharyngeal erythema.   Eyes:      General:         Right eye: No discharge.         Left eye: No discharge.   Cardiovascular:      Rate and Rhythm: Normal rate and regular rhythm.      Pulses: Normal pulses.      Heart sounds: Normal heart sounds.   Pulmonary:      Effort: Pulmonary effort is normal.      Breath sounds: Normal breath sounds.   Abdominal:      General: Bowel sounds are normal. There is no distension.      " Palpations: Abdomen is soft.      Tenderness: There is no abdominal tenderness. There is no guarding.   Genitourinary:     Penis: Normal.       Comments: Right tenderness and with some swelling   Musculoskeletal:         General: No swelling or tenderness.      Cervical back: Normal range of motion. No rigidity or tenderness.      Right lower leg: No edema.      Left lower leg: No edema.   Skin:     General: Skin is warm and dry.      Findings: No bruising, erythema, lesion or rash.   Neurological:      Mental Status: He is alert and oriented to person, place, and time.      Motor: No weakness.      Gait: Gait normal.   Psychiatric:         Mood and Affect: Mood normal.         Behavior: Behavior normal.         Health Maintenance         Date Due Completion Date    Hepatitis C Screening Never done ---    Influenza Vaccine (1) 09/01/2024 2/5/2016    COVID-19 Vaccine (1 - 2024-25 season) Never done ---    TETANUS VACCINE 08/04/2025 8/4/2015    RSV Vaccine (Age 60+ and Pregnant patients) (1 - 1-dose 75+ series) 01/19/2078 ---              ASSESSMENT     19 y.o. male with     1. Testicle swelling        PLAN:     1. Testicle swelling  Uncontrolled,Assessing   -Reviewed patient's imaging history showing complex right hydrocele and left varicocele in March of last year, with subsequent changes in findings over time  -Considered worsening of patient's testicular swelling and pain  -Assessed for potential sexually transmitted infections, but deemed unlikely due to patient's reported lack of sexual activity  - Evaluated patient's lifestyle factors, including occasional alcohol consumption and daily marijuana use  Determined need for urgent urology referral du  - Ambulatory referral/consult to Urology; Future  - ibuprofen (ADVIL,MOTRIN) 600 MG tablet; Take 1 tablet (600 mg total) by mouth 3 (three) times daily as needed for Pain.  Dispense: 25 tablet; Refill: 0    TESTICULAR PAIN AND SWELLING:  - Mr. Bennett to wear jock  strap to support testicles and reduce pain.  - Mr. Bennett to avoid heavy weightlifting that causes testicular strain.  - Started ibuprofen for discomfort.  - Referred to urologist urgently for evaluation of testicular swelling and pain.    FOLLOW UP:  - Follow up in 6 weeks, after expected urology consultation.         RTC in 4 weeks    Wilder Mendez DNP, LISE, FNP-BC [facility]  10/23/2024 3:39 PM      This note was generated with the assistance of ambient listening technology. Verbal consent was obtained by the patient and accompanying visitor(s) for the recording of patient appointment to facilitate this note. I attest to having reviewed and edited the generated note for accuracy, though some syntax or spelling errors may persist. Please contact the author of this note for any clarification.

## 2024-10-23 LAB
ESTIMATED AVG GLUCOSE: 105 MG/DL (ref 68–131)
HBA1C MFR BLD: 5.3 % (ref 4–5.6)

## 2024-11-04 ENCOUNTER — OFFICE VISIT (OUTPATIENT)
Dept: UROLOGY | Facility: CLINIC | Age: 19
End: 2024-11-04
Payer: MEDICAID

## 2024-11-04 VITALS — BODY MASS INDEX: 20.45 KG/M2 | WEIGHT: 146.63 LBS

## 2024-11-04 DIAGNOSIS — N50.3 EPIDIDYMAL CYST: ICD-10-CM

## 2024-11-04 PROBLEM — N43.3 HYDROCELE OF TESTIS: Status: RESOLVED | Noted: 2023-09-07 | Resolved: 2024-11-04

## 2024-11-04 PROCEDURE — 99999 PR PBB SHADOW E&M-EST. PATIENT-LVL II: CPT | Mod: PBBFAC,,, | Performed by: STUDENT IN AN ORGANIZED HEALTH CARE EDUCATION/TRAINING PROGRAM

## 2024-11-04 PROCEDURE — 3008F BODY MASS INDEX DOCD: CPT | Mod: CPTII,,, | Performed by: STUDENT IN AN ORGANIZED HEALTH CARE EDUCATION/TRAINING PROGRAM

## 2024-11-04 PROCEDURE — 3044F HG A1C LEVEL LT 7.0%: CPT | Mod: CPTII,,, | Performed by: STUDENT IN AN ORGANIZED HEALTH CARE EDUCATION/TRAINING PROGRAM

## 2024-11-04 PROCEDURE — 99203 OFFICE O/P NEW LOW 30 MIN: CPT | Mod: S$PBB,,, | Performed by: STUDENT IN AN ORGANIZED HEALTH CARE EDUCATION/TRAINING PROGRAM

## 2024-11-04 PROCEDURE — 99212 OFFICE O/P EST SF 10 MIN: CPT | Mod: PBBFAC | Performed by: STUDENT IN AN ORGANIZED HEALTH CARE EDUCATION/TRAINING PROGRAM

## 2024-11-04 PROCEDURE — 1160F RVW MEDS BY RX/DR IN RCRD: CPT | Mod: CPTII,,, | Performed by: STUDENT IN AN ORGANIZED HEALTH CARE EDUCATION/TRAINING PROGRAM

## 2024-11-04 PROCEDURE — 1159F MED LIST DOCD IN RCRD: CPT | Mod: CPTII,,, | Performed by: STUDENT IN AN ORGANIZED HEALTH CARE EDUCATION/TRAINING PROGRAM

## 2024-11-04 NOTE — PROGRESS NOTES
Patient ID: Nic Bennett is a 19 y.o. male.    Chief Complaint: Scrotal pain  Referral: Wilder Mendez, SIS  [id number] [place] [address]     HPI  19 y.o. who presents to the Urology clinic for evaluation of scrotal swelling, he notes he has had this problem chronically. Not associated with tenderness, worsening of swelling, trauma to scrotum. He was noted to have a 1cm R paratesticular mass/epididymal cyst in 11/2023 on prior ultrasound. He is accompanied by his mother. Patient denies new sexual contacts.   Denies unexplained weight loss  Mother w/ breast cancer    Medically Necessary ROS documented in HPI    Past Medical History  Active Ambulatory Problems     Diagnosis Date Noted    Hydrocele of testis 09/07/2023     Resolved Ambulatory Problems     Diagnosis Date Noted    No Resolved Ambulatory Problems     Past Medical History:   Diagnosis Date    Asthma          Past Surgical History  No past surgical history on file.    Social History       Medications    Current Outpatient Medications:     ibuprofen (ADVIL,MOTRIN) 600 MG tablet, Take 1 tablet (600 mg total) by mouth 3 (three) times daily as needed for Pain., Disp: 25 tablet, Rfl: 0    Allergies  Review of patient's allergies indicates:  No Known Allergies    Patient's PMH, FH, Social hx, Medications, allergies reviewed and updated as pertinent to today's visit    Objective:      Physical Exam  Constitutional:       General: He is not in acute distress.     Appearance: He is well-developed. He is not ill-appearing, toxic-appearing or diaphoretic.   HENT:      Head: Normocephalic and atraumatic.      Mouth/Throat:      Mouth: Mucous membranes are moist.   Eyes:      Conjunctiva/sclera: Conjunctivae normal.   Pulmonary:      Effort: Pulmonary effort is normal. No respiratory distress.   Abdominal:      General: Abdomen is flat. There is no distension.      Palpations: Abdomen is soft. There is no mass.      Tenderness: There is no right CVA  tenderness or left CVA tenderness.   Genitourinary:     Penis: Normal.       Testes: Normal.      Comments: No scrotal swelling  R 2-3 mm paratesticular mass noted within the epididymis, upper pole  L hemiscrotum normal  Musculoskeletal:         General: No swelling or deformity.      Cervical back: Neck supple.   Skin:     Findings: No rash.   Neurological:      Mental Status: He is alert and oriented to person, place, and time.      Gait: Gait normal.   Psychiatric:         Mood and Affect: Mood normal.         Thought Content: Thought content normal.         Judgment: Judgment normal.         Impression    Unchanged 1.0 cm pedunculated structure arising from the right epididymal head compatible with an epididymal appendage. Persistent small simple right hydrocele. No evidence of testicular torsion or intratesticular abnormality.    Given patient's history of persistent right testicular pain and no additional perceivable acute abnormality, torsion of the epididymal appendage may be a diagnostic consideration.    Preliminary Report Dictated By: Indira Liu MD    Electronically Signed By: Mau Irvin MD 11/4/2023 9:51 PM CDT  Narrative    EXAM END TIME:11/4/2023 07:13 PM  CLINICAL HISTORY:  DIAGNOSIS:  REASON FOR STUDY:Persistent right testicular pain and subjective swelling x2 months  ADDITIONAL HISTORY: None.      TECHNIQUE: Grayscale and color Doppler images of the scrotum.    COMPARISON:10/7/2023    FINDINGS:    The right testicle measures 3.3 x 2.2 x 2.1 cm and is unremarkable. There is intact vascular flow to the right testicle. There is an ovoid, pedunculated isoechoic structure arising from the epididymal head measuring up to 1.0 cm, unchanged. There is also a tiny 1 mm cystic structure in the epididymal head likely reflecting a spermatocele/cyst. There is a small, simple appearing right hydrocele. There is no evidence of a right varicocele.    The left testicle measures 3.5 x 1.9 x 2.2 cm and is  unremarkable. There is intact vascular flow to the left testicle. The left epididymis is unremarkable. There is no left hydrocele. There is no evidence of a left varicocele.  Exam End: 11/04/23 19:13    Specimen Collected: 11/04/23 21:10          Assessment:       1. Epididymal cyst        Plan:         Scrotal US  Suspect benign findings, reassurance provided, discussed self examinations  and red flags  Will compare size of paratesticular mass/ epididymal cyst compared to prior imaging

## 2024-11-05 ENCOUNTER — HOSPITAL ENCOUNTER (OUTPATIENT)
Dept: RADIOLOGY | Facility: HOSPITAL | Age: 19
Discharge: HOME | End: 2024-11-05
Attending: STUDENT IN AN ORGANIZED HEALTH CARE EDUCATION/TRAINING PROGRAM
Payer: MEDICAID

## 2024-11-05 DIAGNOSIS — N50.3 EPIDIDYMAL CYST: ICD-10-CM

## 2024-11-05 PROCEDURE — 76870 US EXAM SCROTUM: CPT | Mod: TC

## 2024-11-05 PROCEDURE — 76870 US EXAM SCROTUM: CPT | Mod: 26,,, | Performed by: RADIOLOGY

## 2025-05-07 ENCOUNTER — OFFICE VISIT (OUTPATIENT)
Dept: UROLOGY | Facility: CLINIC | Age: 20
End: 2025-05-07
Payer: MEDICAID

## 2025-05-07 DIAGNOSIS — N50.9 TESTICULAR LESION: Primary | ICD-10-CM

## 2025-05-07 PROCEDURE — 99999 PR PBB SHADOW E&M-EST. PATIENT-LVL I: CPT | Mod: PBBFAC,,, | Performed by: STUDENT IN AN ORGANIZED HEALTH CARE EDUCATION/TRAINING PROGRAM

## 2025-05-07 PROCEDURE — 1159F MED LIST DOCD IN RCRD: CPT | Mod: CPTII,,, | Performed by: STUDENT IN AN ORGANIZED HEALTH CARE EDUCATION/TRAINING PROGRAM

## 2025-05-07 PROCEDURE — 99211 OFF/OP EST MAY X REQ PHY/QHP: CPT | Mod: PBBFAC | Performed by: STUDENT IN AN ORGANIZED HEALTH CARE EDUCATION/TRAINING PROGRAM

## 2025-05-07 PROCEDURE — 99212 OFFICE O/P EST SF 10 MIN: CPT | Mod: S$PBB,,, | Performed by: STUDENT IN AN ORGANIZED HEALTH CARE EDUCATION/TRAINING PROGRAM

## 2025-05-07 PROCEDURE — 1160F RVW MEDS BY RX/DR IN RCRD: CPT | Mod: CPTII,,, | Performed by: STUDENT IN AN ORGANIZED HEALTH CARE EDUCATION/TRAINING PROGRAM

## 2025-05-07 NOTE — PROGRESS NOTES
Patient ID: Nic Bennett is a 20 y.o. male.    Chief Complaint: Scrotal pain  Referral: No referring provider defined for this encounter.     HPI  20 y.o. who presents to the Urology clinic for follow up of chronic scrotal mass. Not associated with tenderness, worsening of swelling, trauma to scrotum. He was noted to have a 1cm R paratesticular mass/epididymal cyst in 11/2023 on prior ultrasound, stable on imaging from 6 m ago.  He is accompanied by his mother. Denies unexplained weight loss or new scrotal exam findings since last exam.   Mother w/ breast cancer    Medically Necessary ROS documented in HPI    Past Medical History  Active Ambulatory Problems     Diagnosis Date Noted    No Active Ambulatory Problems     Resolved Ambulatory Problems     Diagnosis Date Noted    Hydrocele of testis 09/07/2023     Past Medical History:   Diagnosis Date    Asthma          Past Surgical History  No past surgical history on file.    Social History       Medications    Current Outpatient Medications:     ibuprofen (ADVIL,MOTRIN) 600 MG tablet, Take 1 tablet (600 mg total) by mouth 3 (three) times daily as needed for Pain., Disp: 25 tablet, Rfl: 0    Allergies  Review of patient's allergies indicates:  No Known Allergies    Patient's PMH, FH, Social hx, Medications, allergies reviewed and updated as pertinent to today's visit    Objective:      Physical Exam  Constitutional:       General: He is not in acute distress.     Appearance: He is well-developed. He is not ill-appearing, toxic-appearing or diaphoretic.   HENT:      Head: Normocephalic and atraumatic.      Mouth/Throat:      Mouth: Mucous membranes are moist.   Eyes:      Conjunctiva/sclera: Conjunctivae normal.   Pulmonary:      Effort: Pulmonary effort is normal. No respiratory distress.   Abdominal:      General: Abdomen is flat. There is no distension.   Musculoskeletal:         General: No swelling or deformity.      Cervical back: Neck supple.   Skin:      Findings: No rash.   Neurological:      Mental Status: He is alert and oriented to person, place, and time.      Gait: Gait normal.   Psychiatric:         Mood and Affect: Mood normal.         Thought Content: Thought content normal.         Judgment: Judgment normal.         Impression    Unchanged 1.0 cm pedunculated structure arising from the right epididymal head compatible with an epididymal appendage. Persistent small simple right hydrocele. No evidence of testicular torsion or intratesticular abnormality.    Given patient's history of persistent right testicular pain and no additional perceivable acute abnormality, torsion of the epididymal appendage may be a diagnostic consideration.    Preliminary Report Dictated By: Indira Liu MD    Electronically Signed By: Mau Irvin MD 11/4/2023 9:51 PM CDT  Narrative    EXAM END TIME:11/4/2023 07:13 PM  CLINICAL HISTORY:  DIAGNOSIS:  REASON FOR STUDY:Persistent right testicular pain and subjective swelling x2 months  ADDITIONAL HISTORY: None.      TECHNIQUE: Grayscale and color Doppler images of the scrotum.    COMPARISON:10/7/2023    FINDINGS:    The right testicle measures 3.3 x 2.2 x 2.1 cm and is unremarkable. There is intact vascular flow to the right testicle. There is an ovoid, pedunculated isoechoic structure arising from the epididymal head measuring up to 1.0 cm, unchanged. There is also a tiny 1 mm cystic structure in the epididymal head likely reflecting a spermatocele/cyst. There is a small, simple appearing right hydrocele. There is no evidence of a right varicocele.    The left testicle measures 3.5 x 1.9 x 2.2 cm and is unremarkable. There is intact vascular flow to the left testicle. The left epididymis is unremarkable. There is no left hydrocele. There is no evidence of a left varicocele.  Exam End: 11/04/23 19:13    Specimen Collected: 11/04/23 21:10        Narrative & Impression  EXAMINATION:  US SCROTUM AND TESTICLES     CLINICAL  HISTORY:  Cyst of epididymis     TECHNIQUE:  Sonography of the scrotum and testes.     COMPARISON:  03/01/2023 testicular ultrasound     FINDINGS:  Right Testicle:     - size: 2.4 x 1.5 x 2.2 cm     - appearance: Mildly heterogeneous.  Ill define subtle area of hypoechogenicity within the lower testis an area measuring about 0.2 cm diameter.  Benign appendage noted 0.6 cm.     - flow: Normal arterial and venous flow     - epididymis: Normal.     - hydrocele: Small.     - varicocele: None.     Left Testicle:     - size: 3.3 x 1.9 x 1.9 cm     - appearance: Mildly diffusely heterogeneous.     - flow: Normal arterial and venous flow     - epididymis: Normal.     - hydrocele: None.     - varicocele: Present.     Other findings: None.     Impression:     Subtle heterogeneity of the bilateral testis.  There is a small nonspecific area of hypoechogenicity within the lower right testis, follow-up advised to ensure stability or resolution.     Small right hydrocele.     Left varicocele.        Electronically signed by:Lizzeth Burgess MD  Date:                                            11/06/2024  Time:                                           07:57      Assessment:       1. Testicular lesion        Plan:       Discussed follow up of Right lower pole testicular lesion  Scrotal US

## 2025-05-09 ENCOUNTER — HOSPITAL ENCOUNTER (OUTPATIENT)
Dept: RADIOLOGY | Facility: HOSPITAL | Age: 20
Discharge: HOME | End: 2025-05-09
Attending: STUDENT IN AN ORGANIZED HEALTH CARE EDUCATION/TRAINING PROGRAM
Payer: MEDICAID

## 2025-05-09 DIAGNOSIS — N50.9 TESTICULAR LESION: ICD-10-CM

## 2025-05-09 PROCEDURE — 76870 US EXAM SCROTUM: CPT | Mod: TC

## 2025-05-09 PROCEDURE — 76870 US EXAM SCROTUM: CPT | Mod: 26,,, | Performed by: RADIOLOGY

## 2025-05-12 ENCOUNTER — RESULTS FOLLOW-UP (OUTPATIENT)
Dept: UROLOGY | Facility: CLINIC | Age: 20
End: 2025-05-12